# Patient Record
Sex: FEMALE | Race: WHITE | HISPANIC OR LATINO | Employment: FULL TIME | URBAN - METROPOLITAN AREA
[De-identification: names, ages, dates, MRNs, and addresses within clinical notes are randomized per-mention and may not be internally consistent; named-entity substitution may affect disease eponyms.]

---

## 2022-04-11 PROBLEM — F41.9 ANXIETY: Status: ACTIVE | Noted: 2022-04-11

## 2022-04-11 PROBLEM — F32.9 MAJOR DEPRESSIVE DISORDER: Status: ACTIVE | Noted: 2022-04-11

## 2022-04-11 PROBLEM — F32.81 PMDD (PREMENSTRUAL DYSPHORIC DISORDER): Status: ACTIVE | Noted: 2022-04-11

## 2022-04-25 PROBLEM — E55.9 VITAMIN D INSUFFICIENCY: Status: ACTIVE | Noted: 2022-04-25

## 2022-10-06 PROBLEM — Z87.898 HX OF URINARY FREQUENCY: Status: ACTIVE | Noted: 2022-10-06

## 2022-10-06 PROBLEM — G89.29 CHRONIC PAIN OF BOTH KNEES: Status: ACTIVE | Noted: 2022-10-06

## 2022-10-06 PROBLEM — M25.561 CHRONIC PAIN OF BOTH KNEES: Status: ACTIVE | Noted: 2022-10-06

## 2022-10-06 PROBLEM — K21.9 GASTROESOPHAGEAL REFLUX DISEASE: Status: ACTIVE | Noted: 2022-10-06

## 2022-10-06 PROBLEM — M25.562 CHRONIC PAIN OF BOTH KNEES: Status: ACTIVE | Noted: 2022-10-06

## 2022-11-21 ENCOUNTER — HOSPITAL ENCOUNTER (EMERGENCY)
Facility: MEDICAL CENTER | Age: 32
End: 2022-11-21
Attending: EMERGENCY MEDICINE
Payer: COMMERCIAL

## 2022-11-21 ENCOUNTER — APPOINTMENT (OUTPATIENT)
Dept: RADIOLOGY | Facility: MEDICAL CENTER | Age: 32
End: 2022-11-21
Attending: EMERGENCY MEDICINE
Payer: COMMERCIAL

## 2022-11-21 VITALS
TEMPERATURE: 97.2 F | SYSTOLIC BLOOD PRESSURE: 115 MMHG | HEIGHT: 66 IN | HEART RATE: 69 BPM | RESPIRATION RATE: 18 BRPM | OXYGEN SATURATION: 96 % | WEIGHT: 160 LBS | DIASTOLIC BLOOD PRESSURE: 72 MMHG | BODY MASS INDEX: 25.71 KG/M2

## 2022-11-21 DIAGNOSIS — J06.9 VIRAL URI WITH COUGH: ICD-10-CM

## 2022-11-21 LAB
FLUAV RNA SPEC QL NAA+PROBE: POSITIVE
FLUBV RNA SPEC QL NAA+PROBE: NEGATIVE
RSV RNA SPEC QL NAA+PROBE: NEGATIVE
SARS-COV-2 RNA RESP QL NAA+PROBE: NOTDETECTED
SPECIMEN SOURCE: ABNORMAL

## 2022-11-21 PROCEDURE — 71045 X-RAY EXAM CHEST 1 VIEW: CPT

## 2022-11-21 PROCEDURE — 700102 HCHG RX REV CODE 250 W/ 637 OVERRIDE(OP): Performed by: EMERGENCY MEDICINE

## 2022-11-21 PROCEDURE — 99283 EMERGENCY DEPT VISIT LOW MDM: CPT

## 2022-11-21 PROCEDURE — 0241U HCHG SARS-COV-2 COVID-19 NFCT DS RESP RNA 4 TRGT MIC: CPT

## 2022-11-21 PROCEDURE — C9803 HOPD COVID-19 SPEC COLLECT: HCPCS | Performed by: EMERGENCY MEDICINE

## 2022-11-21 PROCEDURE — A9270 NON-COVERED ITEM OR SERVICE: HCPCS | Performed by: EMERGENCY MEDICINE

## 2022-11-21 RX ORDER — ACETAMINOPHEN 325 MG/1
650 TABLET ORAL ONCE
Status: COMPLETED | OUTPATIENT
Start: 2022-11-21 | End: 2022-11-21

## 2022-11-21 RX ADMIN — ACETAMINOPHEN 650 MG: 325 TABLET, FILM COATED ORAL at 22:00

## 2022-11-21 ASSESSMENT — FIBROSIS 4 INDEX: FIB4 SCORE: 0.84

## 2022-11-22 NOTE — ED PROVIDER NOTES
"ED Provider Note    CHIEF COMPLAINT  Chief Complaint   Patient presents with    Flu Like Symptoms     Cough, body aches, chills. Recent travel from [facility].       HPI  Martinez Pinzon is a 32 y.o. female who presents to the emergency department with concern for possible URI.  Past medical history as document below.  Recent air travel for work.  Likely exposure to sick persons.  She does note specifically that there is at least 1 person sick within the work group.  Now with increasing cough.  Has had some relief with NSAIDs during her flights.  Now wanting further work-up and evaluation here.    REVIEW OF SYSTEMS  See HPI for further details. All other systems are negative.     PAST MEDICAL HISTORY   has a past medical history of Acid reflux, Anxiety, and Patient denies medical problems.    SOCIAL HISTORY  Social History     Tobacco Use    Smoking status: Former     Types: Cigarettes    Smokeless tobacco: Never   Vaping Use    Vaping Use: Never used   Substance and Sexual Activity    Alcohol use: Not Currently    Drug use: Not Currently    Sexual activity: Not on file       SURGICAL HISTORY   has a past surgical history that includes other.    CURRENT MEDICATIONS  Home Medications       Reviewed by Wendy Ramachandran R.N. (Registered Nurse) on 11/21/22 at 2039  Med List Status: Not Addressed     Medication Last Dose Status   vitamin D (VITAMIND D3) 1000 UNIT Tab  Active                    ALLERGIES  No Known Allergies    PHYSICAL EXAM  VITAL SIGNS: /72   Pulse 69   Temp 36.2 °C (97.2 °F) (Oral)   Resp 18   Ht 1.676 m (5' 6\")   Wt 72.6 kg (160 lb)   LMP 11/21/2022 (Exact Date)   SpO2 96%   BMI 25.82 kg/m²  @DARIELA[[id number]::@  Pulse ox interpretation: I interpret this pulse ox as normal.  Constitutional: Alert in no apparent distress.  HENT: Normocephalic, Atraumatic, Bilateral external ears normal. Nose normal.   Eyes: Pupils are equal and reactive.   Heart: Regular rate and rythm, no murmurs.    Lungs: " Clear to auscultation bilaterally.  Dry cough  Skin: Warm, Dry, No erythema, No rash.   Neurologic: Alert, Grossly non-focal.   Psychiatric: Affect normal, Judgment normal, Mood normal, Appears appropriate and not intoxicated.     Results for orders placed or performed during the hospital encounter of 11/21/22   COV-2, FLU A/B, AND RSV BY PCR (2-4 HOURS CEPHEID): Collect NP swab in VTM    Specimen: Nasopharyngeal; Respirate   Result Value Ref Range    Influenza virus A RNA POSITIVE (A) Negative    Influenza virus B, PCR Negative Negative    RSV, PCR Negative Negative    SARS-CoV-2 by PCR NotDetected     SARS-CoV-2 Source NP Swab        DX-CHEST-PORTABLE (1 VIEW)   Final Result         1.  No acute cardiopulmonary disease.            COURSE & MEDICAL DECISION MAKING  Pertinent Labs & Imaging studies reviewed. (See chart for details)    32-year-old female presenting to the emergency department with the above presentation.  She has come ill after a known sick contact while on work travel.  Now testing positive for influenza.  Chest x-ray is negative.  Understanding of ongoing home care and over-the-counter medication use.  Understand return precautions and outpatient follow-up with PCP as discussed.       The patient will return for worsening symptoms and is stable at the time of discharge. The patient verbalizes understanding and will comply.    FINAL IMPRESSION  1. Viral URI with cough    2. Influenza            Electronically signed by: Jean Carlos Servin M.D., 11/21/2022 10:19 PM

## 2022-11-22 NOTE — ED TRIAGE NOTES
.  Chief Complaint   Patient presents with    Flu Like Symptoms     Cough, body aches, chills. Recent travel from [facility].      Pt ambulate to triage with above complaints. Notes S/S x 1 day.    Pt took IBU 2 hours ago with some relief f symptoms.    Pt educated on triage process and returned to [facility].

## 2023-08-19 PROBLEM — R43.0 ANOSMIA: Status: ACTIVE | Noted: 2023-08-19

## 2023-11-06 PROBLEM — F41.8 ANXIETY WITH DEPRESSION: Status: ACTIVE | Noted: 2023-11-06

## 2024-02-05 PROBLEM — R06.02 SOB (SHORTNESS OF BREATH): Status: ACTIVE | Noted: 2024-02-05

## 2024-05-11 ENCOUNTER — APPOINTMENT (OUTPATIENT)
Dept: URGENT CARE | Facility: CLINIC | Age: 34
End: 2024-05-11
Payer: COMMERCIAL